# Patient Record
Sex: FEMALE | Race: OTHER
[De-identification: names, ages, dates, MRNs, and addresses within clinical notes are randomized per-mention and may not be internally consistent; named-entity substitution may affect disease eponyms.]

---

## 2020-10-01 ENCOUNTER — HOSPITAL ENCOUNTER (EMERGENCY)
Dept: HOSPITAL 61 - ER | Age: 40
LOS: 1 days | Discharge: HOME | End: 2020-10-02
Payer: COMMERCIAL

## 2020-10-01 VITALS — WEIGHT: 170.42 LBS | BODY MASS INDEX: 29.09 KG/M2 | HEIGHT: 64 IN

## 2020-10-01 VITALS — DIASTOLIC BLOOD PRESSURE: 104 MMHG | SYSTOLIC BLOOD PRESSURE: 174 MMHG

## 2020-10-01 DIAGNOSIS — G93.89: Primary | ICD-10-CM

## 2020-10-01 DIAGNOSIS — K21.9: ICD-10-CM

## 2020-10-01 DIAGNOSIS — R22.0: ICD-10-CM

## 2020-10-01 DIAGNOSIS — F41.9: ICD-10-CM

## 2020-10-01 DIAGNOSIS — F32.9: ICD-10-CM

## 2020-10-01 PROCEDURE — 99284 EMERGENCY DEPT VISIT MOD MDM: CPT

## 2020-10-01 PROCEDURE — 70450 CT HEAD/BRAIN W/O DYE: CPT
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
## 2020-10-02 NOTE — RAD
EXAM: CT head without contrast

 

INDICATION: Headache, recently diagnosed with brain tumor.

 

COMPARISON: None

 

TECHNIQUE: Axial CT imaging through the head without intravenous contrast.

Sagittal and coronal reformats were obtained.

 

 

One or more of the following individualized dose reduction techniques were

utilized for this examination:  

 

1. Automated exposure control

2. Adjustment of the mA and/or kV according to patient size

3. Use of iterative reconstruction technique.

 

FINDINGS:

There is a 2.4 x 2.3 cm hyperdense mass in the suprasellar cistern. There 

is expansion of the sella. There is no intracranial hemorrhage or acute 

infarct. The ventricles and sulci are normal. No midline shift.

 

No acute fracture. Paranasal sinuses and mastoid air cells are clear. 

Globes and orbits are intact.

 

IMPRESSION:  2.4 x 2.3 cm sellar/suprasellar mass. Differential diagnosis 

includes pituitary macroadenoma, meningioma, or less likely aneurysm.

 

Results discussed by Dr. Valdemar Duron ay 12:14 AM on 10/2/2020

 

**********FOR INTERNAL CODING PURPOSES**********

 

Critical result:

 

Findings discussed with  PASQUALE DURON at 10/2/2020 12:14 AM.

 

RESULT CODE: (C)  

 

 

 

 

 

Electronically signed by: Jeri Aldrich MD (10/2/2020 12:23 AM) [id number]